# Patient Record
Sex: FEMALE | Race: WHITE | NOT HISPANIC OR LATINO | Employment: OTHER | URBAN - METROPOLITAN AREA
[De-identification: names, ages, dates, MRNs, and addresses within clinical notes are randomized per-mention and may not be internally consistent; named-entity substitution may affect disease eponyms.]

---

## 2020-02-17 ENCOUNTER — HYDRAFACIAL SOLO LIPS (EC) (OUTPATIENT)
Dept: URBAN - METROPOLITAN AREA CLINIC 32 | Facility: CLINIC | Age: 75
Setting detail: DERMATOLOGY
End: 2020-02-17

## 2020-02-17 DIAGNOSIS — Z41.9 ENCOUNTER FOR PROCEDURE FOR PURPOSES OTHER THAN REMEDYING HEALTH STATE, UNSPECIFIED: ICD-10-CM

## 2020-02-17 PROCEDURE — OTHER: OTHER

## 2020-06-16 ENCOUNTER — OFFICE VISIT (OUTPATIENT)
Dept: URBAN - METROPOLITAN AREA CLINIC 19 | Facility: CLINIC | Age: 75
End: 2020-06-16
Payer: MEDICARE

## 2020-06-16 DIAGNOSIS — R05 CHRONIC COUGH: ICD-10-CM

## 2020-06-16 DIAGNOSIS — K44.9 HIATAL HERNIA: ICD-10-CM

## 2020-06-16 DIAGNOSIS — K21.0 GASTROESOPHAGEAL REFLUX DISEASE WITH ESOPHAGITIS: ICD-10-CM

## 2020-06-16 PROCEDURE — G8427 DOCREV CUR MEDS BY ELIG CLIN: HCPCS | Performed by: INTERNAL MEDICINE

## 2020-06-16 PROCEDURE — 3017F COLORECTAL CA SCREEN DOC REV: CPT | Performed by: INTERNAL MEDICINE

## 2020-06-16 PROCEDURE — G9903 PT SCRN TBCO ID AS NON USER: HCPCS | Performed by: INTERNAL MEDICINE

## 2020-06-16 PROCEDURE — G8420 CALC BMI NORM PARAMETERS: HCPCS | Performed by: INTERNAL MEDICINE

## 2020-06-16 PROCEDURE — 99213 OFFICE O/P EST LOW 20 MIN: CPT | Performed by: INTERNAL MEDICINE

## 2020-06-16 RX ORDER — FAMOTIDINE 40 MG/1
TAKE 1 TABLET (40 MG) BY ORAL ROUTE ONCE DAILY AT BEDTIME FOR 90 DAYS TABLET ORAL 1
Qty: 90 | Refills: 1 | Status: ACTIVE | COMMUNITY
Start: 2020-01-30 | End: 2020-07-28

## 2020-06-16 RX ORDER — OMEPRAZOLE 40 MG/1
TAKE 1 CAPSULE (40 MG) BY ORAL ROUTE 2 TIMES PER DAY BEFORE A MEAL FOR 90 DAYS CAPSULE, DELAYED RELEASE PELLETS ORAL 2
Qty: 180 | Refills: 1
Start: 2020-01-30

## 2020-06-16 RX ORDER — CYCLOSPORINE 0.5 MG/ML
EMULSION OPHTHALMIC
Qty: 0 | Refills: 0 | Status: ACTIVE | COMMUNITY
Start: 1900-01-01

## 2020-06-16 RX ORDER — ALPHA-D-GALACTOSIDASE 400 UNIT
1 TABLET TABLET ORAL QHS
Qty: 90 | Refills: 3 | OUTPATIENT
Start: 2020-06-16

## 2020-06-16 RX ORDER — OMEPRAZOLE 40 MG/1
TAKE 1 CAPSULE (40 MG) BY ORAL ROUTE 2 TIMES PER DAY BEFORE A MEAL FOR 90 DAYS CAPSULE, DELAYED RELEASE PELLETS ORAL 2
Qty: 180 | Refills: 1 | Status: ACTIVE | COMMUNITY
Start: 2020-01-30 | End: 2020-07-28

## 2020-06-16 NOTE — PHYSICAL EXAM CHEST:
no lesions,  no deformities,  no traumatic injuries, chest wall non-tender,  no masses present, breathing is unlabored without accessory muscle use, normal breath sounds

## 2020-06-16 NOTE — PHYSICAL EXAM GASTROINTESTINAL
Abdomen , soft, nontender, nondistended , no guarding or rigidity , no masses palpable , normal bowel sounds , Liver and Spleen , no hepatomegaly present , no hepatosplenomegaly , liver nontender , spleen not palpable Rectal deferred

## 2020-06-16 NOTE — PHYSICAL EXAM HENT:
Head,  normocephalic,  atraumatic,  Face,  Face within normal limits,  Ears,  External ears within normal limits,  Nose/Nasopharynx,  External nose  normal appearance, no nasal discharge,  Mouth and Throat,  Oral cavity appearance normal,  Lips,  Appearance normal

## 2020-06-16 NOTE — HPI-TODAY'S VISIT:
The patient is a 74 year old /White female, who presents on referral from Meme Grant NP, for a gastroenterology evaluation for chronic cough. A copy of this document will be sent to the referring provider.  12/10/19: Since September, patient has been dealing with a chronic cough. She was first diagnosed with chronic bronchitis and prescribed antibiotics/steroids. Her symptoms persisted, and she was told that she may have a form of laryngospasm with a paroxysmal cough. Her cough is worse at night, especially when lying down. Her pulmonologist, Dr. THU Anguiano, sent her to me for further evaluation in case there is a reflux component. She denies any heartburn. She does have some mild dysphagia, mainly oropharyngeal. She gets regular Cologuard screening, which have been negative. She has never had a colonoscopy. NOTE: At the time of this dictation (12/31/19), the patient has been diagnosed with bronchiectasis. She has now undergone an EGD with dilation and esophagram (showing cervical osteophytes/hiatal hernia).   1/30/20: Patient presents for follow-up. After her dilation, she feels better. Still has some coughing spells when she laughs and bends over. Afraid of side effects of PPI - will try to taper after a few more months of therapy. I am not recommending hiatal hernia repair.  6/16/20: Patient presents for follow-up. She is doing very well on pantoprazole BID and famotidine QHS . No coughing or heartburn. She tried decreasing the pantoprazole to QD dosing abotu 2 months ago, and the cough came back. She asked again about her hiatal hernia - I assured her that while the hiatal hernia is indirectly related to her cough, it does not need to be repaired.

## 2020-09-14 ENCOUNTER — OFFICE VISIT (OUTPATIENT)
Dept: URBAN - METROPOLITAN AREA CLINIC 80 | Facility: CLINIC | Age: 75
End: 2020-09-14
Payer: MEDICARE

## 2020-09-14 ENCOUNTER — WEB ENCOUNTER (OUTPATIENT)
Dept: URBAN - METROPOLITAN AREA CLINIC 80 | Facility: CLINIC | Age: 75
End: 2020-09-14

## 2020-09-14 DIAGNOSIS — K21.0 GASTROESOPHAGEAL REFLUX DISEASE WITH ESOPHAGITIS: ICD-10-CM

## 2020-09-14 DIAGNOSIS — K57.30 COLON, DIVERTICULOSIS: ICD-10-CM

## 2020-09-14 DIAGNOSIS — R05 CHRONIC COUGH: ICD-10-CM

## 2020-09-14 DIAGNOSIS — K44.9 HIATAL HERNIA: ICD-10-CM

## 2020-09-14 DIAGNOSIS — M54.9 BACK PAIN, UNSPECIFIED BACK LOCATION, UNSPECIFIED BACK PAIN LATERALITY, UNSPECIFIED CHRONICITY: ICD-10-CM

## 2020-09-14 PROCEDURE — 1036F TOBACCO NON-USER: CPT | Performed by: INTERNAL MEDICINE

## 2020-09-14 PROCEDURE — G9903 PT SCRN TBCO ID AS NON USER: HCPCS | Performed by: INTERNAL MEDICINE

## 2020-09-14 PROCEDURE — 3017F COLORECTAL CA SCREEN DOC REV: CPT | Performed by: INTERNAL MEDICINE

## 2020-09-14 PROCEDURE — 99213 OFFICE O/P EST LOW 20 MIN: CPT | Performed by: INTERNAL MEDICINE

## 2020-09-14 PROCEDURE — G8420 CALC BMI NORM PARAMETERS: HCPCS | Performed by: INTERNAL MEDICINE

## 2020-09-14 PROCEDURE — G8427 DOCREV CUR MEDS BY ELIG CLIN: HCPCS | Performed by: INTERNAL MEDICINE

## 2020-09-14 RX ORDER — OMEPRAZOLE 40 MG/1
TAKE 1 CAPSULE (40 MG) BY ORAL ROUTE 2 TIMES PER DAY BEFORE A MEAL FOR 90 DAYS CAPSULE, DELAYED RELEASE PELLETS ORAL 2
Qty: 180 | Refills: 1 | Status: ACTIVE | COMMUNITY
Start: 2020-01-30

## 2020-09-14 RX ORDER — CYCLOSPORINE 0.5 MG/ML
EMULSION OPHTHALMIC
Qty: 0 | Refills: 0 | Status: ACTIVE | COMMUNITY
Start: 1900-01-01

## 2020-09-14 RX ORDER — ALPHA-D-GALACTOSIDASE 400 UNIT
1 TABLET TABLET ORAL QHS
Qty: 90 | Refills: 3 | Status: ACTIVE | COMMUNITY
Start: 2020-06-16

## 2020-09-14 NOTE — HPI-TODAY'S VISIT:
Cough is cured!  Had 3 UTIs, CT was done by PAT which showed Diverticulosis  Keeps getting a back pain, lower on the R side, if she moves or tries to take a breath it tightens, and then she can't catch her breath.  No bowel issues or problems, mild minimal constipation.

## 2020-10-13 ENCOUNTER — OFFICE VISIT (OUTPATIENT)
Dept: URBAN - METROPOLITAN AREA CLINIC 19 | Facility: CLINIC | Age: 75
End: 2020-10-13

## 2020-10-27 ENCOUNTER — OFFICE VISIT (OUTPATIENT)
Dept: URBAN - METROPOLITAN AREA CLINIC 19 | Facility: CLINIC | Age: 75
End: 2020-10-27
Payer: MEDICARE

## 2020-10-27 DIAGNOSIS — K44.9 HIATAL HERNIA: ICD-10-CM

## 2020-10-27 DIAGNOSIS — K21.00 CHRONIC REFLUX ESOPHAGITIS: ICD-10-CM

## 2020-10-27 DIAGNOSIS — R05 CHRONIC COUGH: ICD-10-CM

## 2020-10-27 PROCEDURE — 99213 OFFICE O/P EST LOW 20 MIN: CPT | Performed by: INTERNAL MEDICINE

## 2020-10-27 PROCEDURE — G9903 PT SCRN TBCO ID AS NON USER: HCPCS | Performed by: INTERNAL MEDICINE

## 2020-10-27 PROCEDURE — G8427 DOCREV CUR MEDS BY ELIG CLIN: HCPCS | Performed by: INTERNAL MEDICINE

## 2020-10-27 PROCEDURE — 1036F TOBACCO NON-USER: CPT | Performed by: INTERNAL MEDICINE

## 2020-10-27 PROCEDURE — G8417 CALC BMI ABV UP PARAM F/U: HCPCS | Performed by: INTERNAL MEDICINE

## 2020-10-27 PROCEDURE — 3017F COLORECTAL CA SCREEN DOC REV: CPT | Performed by: INTERNAL MEDICINE

## 2020-10-27 PROCEDURE — G8484 FLU IMMUNIZE NO ADMIN: HCPCS | Performed by: INTERNAL MEDICINE

## 2020-10-27 RX ORDER — OMEPRAZOLE 40 MG/1
TAKE 1 CAPSULE (40 MG) BY ORAL ROUTE 2 TIMES PER DAY BEFORE A MEAL FOR 90 DAYS CAPSULE, DELAYED RELEASE PELLETS ORAL 2
Qty: 180 | Refills: 1 | Status: ACTIVE | COMMUNITY
Start: 2020-01-30

## 2020-10-27 RX ORDER — ALPHA-D-GALACTOSIDASE 400 UNIT
1 TABLET TABLET ORAL QHS
Qty: 90 | Refills: 3 | Status: ACTIVE | COMMUNITY
Start: 2020-06-16

## 2020-10-27 RX ORDER — OMEPRAZOLE 40 MG/1
TAKE 1 CAPSULE (40 MG) BY ORAL ROUTE 2 TIMES PER DAY BEFORE A MEAL FOR 90 DAYS CAPSULE, DELAYED RELEASE PELLETS ORAL ONCE A DAY
Qty: 90 | Refills: 2
Start: 2020-01-30

## 2020-10-27 RX ORDER — CYCLOSPORINE 0.5 MG/ML
EMULSION OPHTHALMIC
Qty: 0 | Refills: 0 | Status: ACTIVE | COMMUNITY
Start: 1900-01-01

## 2020-10-27 NOTE — HPI-TODAY'S VISIT:
The patient is a 74 year old /White female, who presents on referral from Meme Grant NP, for a gastroenterology evaluation for chronic cough. A copy of this document will be sent to the referring provider.  12/10/19: Since September, patient has been dealing with a chronic cough. She was first diagnosed with chronic bronchitis and prescribed antibiotics/steroids. Her symptoms persisted, and she was told that she may have a form of laryngospasm with a paroxysmal cough. Her cough is worse at night, especially when lying down. Her pulmonologist, Dr. THU Anguiano, sent her to me for further evaluation in case there is a reflux component. She denies any heartburn. She does have some mild dysphagia, mainly oropharyngeal. She gets regular Cologuard screening, which have been negative. She has never had a colonoscopy. NOTE: At the time of this dictation (12/31/19), the patient has been diagnosed with bronchiectasis. She has now undergone an EGD with dilation and esophagram (showing cervical osteophytes/hiatal hernia).   1/30/20: Patient presents for follow-up. After her dilation, she feels better. Still has some coughing spells when she laughs and bends over. Afraid of side effects of PPI - will try to taper after a few more months of therapy. I am not recommending hiatal hernia repair.  6/16/20: Patient presents for follow-up. She is doing very well on pantoprazole BID and famotidine QHS . No coughing or heartburn. She tried decreasing the pantoprazole to QD dosing abotu 2 months ago, and the cough came back. She asked again about her hiatal hernia - I assured her that while the hiatal hernia is indirectly related to her cough, it does not need to be repaired.  9/14/20 (Dr. Nazanin Zhao):  Cough is cured!  Had 3 UTIs, CT was done by PAT which showed Diverticulosis  Keeps getting a back pain, lower on the R side, if she moves or tries to take a breath it tightens, and then she can't catch her breath.  No bowel issues or problems, mild minimal constipation.  10/27/20:  Patient presents for reevaluation of her chronic cough.  She still has good control of her symptoms - now taking omeprazole 40 mg po QD without the need for an H2-blocker.

## 2020-11-23 ENCOUNTER — TELEPHONE ENCOUNTER (OUTPATIENT)
Dept: URBAN - METROPOLITAN AREA CLINIC 19 | Facility: CLINIC | Age: 75
End: 2020-11-23

## 2020-11-23 RX ORDER — FAMOTIDINE 40 MG/1
TAKE 1 TABLET (40 MG) BY ORAL ROUTE ONCE DAILY AT BEDTIME FOR 90 DAYS TABLET ORAL 1
Qty: 90 | Refills: 3
Start: 2020-01-30

## 2021-04-15 ENCOUNTER — OFFICE VISIT (OUTPATIENT)
Dept: URBAN - METROPOLITAN AREA CLINIC 128 | Facility: CLINIC | Age: 76
End: 2021-04-15
Payer: MEDICARE

## 2021-04-15 DIAGNOSIS — K44.9 HIATAL HERNIA: ICD-10-CM

## 2021-04-15 DIAGNOSIS — K21.9 ACID REFLUX: ICD-10-CM

## 2021-04-15 DIAGNOSIS — R05 CHRONIC COUGH: ICD-10-CM

## 2021-04-15 PROBLEM — 266433003: Status: ACTIVE | Noted: 2020-06-16

## 2021-04-15 PROBLEM — 84089009: Status: ACTIVE | Noted: 2020-06-16

## 2021-04-15 PROCEDURE — 99212 OFFICE O/P EST SF 10 MIN: CPT | Performed by: INTERNAL MEDICINE

## 2021-04-15 RX ORDER — CYCLOSPORINE 0.5 MG/ML
EMULSION OPHTHALMIC
Qty: 0 | Refills: 0 | Status: ACTIVE | COMMUNITY
Start: 1900-01-01

## 2021-04-15 RX ORDER — ALPHA-D-GALACTOSIDASE 400 UNIT
1 TABLET TABLET ORAL QHS
Qty: 90 | Refills: 3 | Status: ACTIVE | COMMUNITY
Start: 2020-06-16

## 2021-04-15 RX ORDER — FAMOTIDINE 40 MG/1
TAKE 1 TABLET (40 MG) BY ORAL ROUTE ONCE DAILY AT BEDTIME FOR 90 DAYS TABLET ORAL 1
Qty: 90 | Refills: 3 | Status: ACTIVE | COMMUNITY
Start: 2020-01-30

## 2021-04-15 RX ORDER — OMEPRAZOLE 40 MG/1
TAKE 1 CAPSULE (40 MG) BY ORAL ROUTE 2 TIMES PER DAY BEFORE A MEAL FOR 90 DAYS CAPSULE, DELAYED RELEASE PELLETS ORAL ONCE A DAY
Qty: 90 | Refills: 2 | Status: ACTIVE | COMMUNITY
Start: 2020-01-30

## 2021-04-15 NOTE — HPI-TODAY'S VISIT:
The patient is a 74 year old /White female, who presents on referral from Meme Grant NP, for a gastroenterology evaluation for chronic cough. A copy of this document will be sent to the referring provider.  12/10/19: Since September, patient has been dealing with a chronic cough. She was first diagnosed with chronic bronchitis and prescribed antibiotics/steroids. Her symptoms persisted, and she was told that she may have a form of laryngospasm with a paroxysmal cough. Her cough is worse at night, especially when lying down. Her pulmonologist, Dr. THU Anguiano, sent her to me for further evaluation in case there is a reflux component. She denies any heartburn. She does have some mild dysphagia, mainly oropharyngeal. She gets regular Cologuard screening, which have been negative. She has never had a colonoscopy. NOTE: At the time of this dictation (12/31/19), the patient has been diagnosed with bronchiectasis. She has now undergone an EGD with dilation and esophagram (showing cervical osteophytes/hiatal hernia).   1/30/20: Patient presents for follow-up. After her dilation, she feels better. Still has some coughing spells when she laughs and bends over. Afraid of side effects of PPI - will try to taper after a few more months of therapy. I am not recommending hiatal hernia repair.  6/16/20: Patient presents for follow-up. She is doing very well on pantoprazole BID and famotidine QHS . No coughing or heartburn. She tried decreasing the pantoprazole to QD dosing abotu 2 months ago, and the cough came back. She asked again about her hiatal hernia - I assured her that while the hiatal hernia is indirectly related to her cough, it does not need to be repaired.  9/14/20 (Dr. Nazanin Zhao):  Cough is cured!  Had 3 UTIs, CT was done by PAT which showed Diverticulosis  Keeps getting a back pain, lower on the R side, if she moves or tries to take a breath it tightens, and then she can't catch her breath.  No bowel issues or problems, mild minimal constipation.  10/27/20:  Patient presents for reevaluation of her chronic cough.  She still has good control of her symptoms - now taking omeprazole 40 mg po QD without the need for an H2-blocker.  4/15/21:  Patient presents for reevaluation of her chronic cough and GERD issues.  She has no issues with heartburn or cough at this time.  She wants to taper off her PPI.

## 2023-01-30 ENCOUNTER — WEB ENCOUNTER (OUTPATIENT)
Dept: URBAN - METROPOLITAN AREA CLINIC 19 | Facility: CLINIC | Age: 78
End: 2023-01-30

## 2023-01-30 ENCOUNTER — OFFICE VISIT (OUTPATIENT)
Dept: URBAN - METROPOLITAN AREA CLINIC 19 | Facility: CLINIC | Age: 78
End: 2023-01-30
Payer: MEDICARE

## 2023-01-30 ENCOUNTER — DASHBOARD ENCOUNTERS (OUTPATIENT)
Age: 78
End: 2023-01-30

## 2023-01-30 ENCOUNTER — LAB OUTSIDE AN ENCOUNTER (OUTPATIENT)
Dept: URBAN - METROPOLITAN AREA CLINIC 19 | Facility: CLINIC | Age: 78
End: 2023-01-30

## 2023-01-30 VITALS
HEART RATE: 75 BPM | BODY MASS INDEX: 27.76 KG/M2 | TEMPERATURE: 98.2 F | DIASTOLIC BLOOD PRESSURE: 72 MMHG | HEIGHT: 65 IN | SYSTOLIC BLOOD PRESSURE: 126 MMHG | WEIGHT: 166.6 LBS | OXYGEN SATURATION: 95 %

## 2023-01-30 DIAGNOSIS — R19.5 POSITIVE COLORECTAL CANCER SCREENING USING COLOGUARD TEST: ICD-10-CM

## 2023-01-30 DIAGNOSIS — Z86.010 PERSONAL HISTORY OF COLONIC POLYPS: ICD-10-CM

## 2023-01-30 PROCEDURE — 99213 OFFICE O/P EST LOW 20 MIN: CPT | Performed by: NURSE PRACTITIONER

## 2023-01-30 RX ORDER — OMEPRAZOLE 40 MG/1
TAKE 1 CAPSULE (40 MG) BY ORAL ROUTE 2 TIMES PER DAY BEFORE A MEAL FOR 90 DAYS CAPSULE, DELAYED RELEASE PELLETS ORAL ONCE A DAY
Qty: 90 | Refills: 2 | Status: ACTIVE | COMMUNITY
Start: 2020-01-30

## 2023-01-30 RX ORDER — CYCLOSPORINE 0.5 MG/ML
EMULSION OPHTHALMIC
Qty: 0 | Refills: 0 | Status: ACTIVE | COMMUNITY
Start: 1900-01-01

## 2023-01-30 RX ORDER — ALPHA-D-GALACTOSIDASE 400 UNIT
1 TABLET TABLET ORAL QHS
Qty: 90 | Refills: 3 | Status: ACTIVE | COMMUNITY
Start: 2020-06-16

## 2023-01-30 NOTE — HPI-TODAY'S VISIT:
Ms. Castle is a 76 yo female with PMH of HLD, hypothyroidism, anemia, chronic cough, GERD, hiatal hernia who presents today for positive cologuard testing which was done on 1/19/2023. She states she had a colonoscopy done here about 6 years ago, states there were polyps but we do not have record of this in the chart. Will try to locate the report from previous system. She denies having any abdominal or rectal pain. No change in bowel habits. Appetite is good, no unintentional weight loss. No bloody or black stools. Denies having any upper GI complaints. GERD well controlled on Omeprazole 40mg daily. No known family history of any GI or colon cancer or colon polyps.

## 2023-01-30 NOTE — PHYSICAL EXAM GASTROINTESTINAL
Abdomen , soft, slight LLQ tenderness, nondistended , no guarding or rigidity , no masses palpable , normal bowel sounds , Liver and Spleen,  no hepatosplenomegaly , liver nontender

## 2023-01-31 PROBLEM — 428283002: Status: ACTIVE | Noted: 2023-01-30

## 2023-02-01 ENCOUNTER — TELEPHONE ENCOUNTER (OUTPATIENT)
Dept: URBAN - METROPOLITAN AREA CLINIC 19 | Facility: CLINIC | Age: 78
End: 2023-02-01

## 2023-02-03 ENCOUNTER — CLAIMS CREATED FROM THE CLAIM WINDOW (OUTPATIENT)
Dept: URBAN - METROPOLITAN AREA CLINIC 4 | Facility: CLINIC | Age: 78
End: 2023-02-03
Payer: MEDICARE

## 2023-02-03 ENCOUNTER — CLAIMS CREATED FROM THE CLAIM WINDOW (OUTPATIENT)
Dept: URBAN - METROPOLITAN AREA SURGERY CENTER 31 | Facility: SURGERY CENTER | Age: 78
End: 2023-02-03
Payer: MEDICARE

## 2023-02-03 ENCOUNTER — TELEPHONE ENCOUNTER (OUTPATIENT)
Dept: URBAN - METROPOLITAN AREA CLINIC 19 | Facility: CLINIC | Age: 78
End: 2023-02-03

## 2023-02-03 ENCOUNTER — OFFICE VISIT (OUTPATIENT)
Dept: URBAN - METROPOLITAN AREA SURGERY CENTER 31 | Facility: SURGERY CENTER | Age: 78
End: 2023-02-03

## 2023-02-03 DIAGNOSIS — Z86.010 ADENOMAS PERSONAL HISTORY OF COLONIC POLYPS: ICD-10-CM

## 2023-02-03 DIAGNOSIS — D12.8 BENIGN NEOPLASM OF RECTUM: ICD-10-CM

## 2023-02-03 DIAGNOSIS — D12.7 ADENOMA DETERMINED BY COLORECTAL BIOPSY: ICD-10-CM

## 2023-02-03 PROCEDURE — 88305 TISSUE EXAM BY PATHOLOGIST: CPT | Performed by: PATHOLOGY

## 2023-02-03 PROCEDURE — G8907 PT DOC NO EVENTS ON DISCHARG: HCPCS | Performed by: STUDENT IN AN ORGANIZED HEALTH CARE EDUCATION/TRAINING PROGRAM

## 2023-02-03 PROCEDURE — 45385 COLONOSCOPY W/LESION REMOVAL: CPT | Performed by: STUDENT IN AN ORGANIZED HEALTH CARE EDUCATION/TRAINING PROGRAM

## 2023-02-03 RX ORDER — OMEPRAZOLE 40 MG/1
TAKE 1 CAPSULE (40 MG) BY ORAL ROUTE 2 TIMES PER DAY BEFORE A MEAL FOR 90 DAYS CAPSULE, DELAYED RELEASE PELLETS ORAL ONCE A DAY
Qty: 90 | Refills: 2 | Status: ACTIVE | COMMUNITY
Start: 2020-01-30

## 2023-02-03 RX ORDER — CYCLOSPORINE 0.5 MG/ML
EMULSION OPHTHALMIC
Qty: 0 | Refills: 0 | Status: ACTIVE | COMMUNITY
Start: 1900-01-01

## 2023-02-03 RX ORDER — ALPHA-D-GALACTOSIDASE 400 UNIT
1 TABLET TABLET ORAL QHS
Qty: 90 | Refills: 3 | Status: ACTIVE | COMMUNITY
Start: 2020-06-16

## 2023-02-06 ENCOUNTER — TELEPHONE ENCOUNTER (OUTPATIENT)
Dept: URBAN - METROPOLITAN AREA CLINIC 19 | Facility: CLINIC | Age: 78
End: 2023-02-06